# Patient Record
Sex: FEMALE | Race: BLACK OR AFRICAN AMERICAN | Employment: FULL TIME | URBAN - METROPOLITAN AREA
[De-identification: names, ages, dates, MRNs, and addresses within clinical notes are randomized per-mention and may not be internally consistent; named-entity substitution may affect disease eponyms.]

---

## 2020-10-08 ENCOUNTER — HOSPITAL ENCOUNTER (EMERGENCY)
Age: 45
Discharge: HOME | End: 2020-10-08
Attending: EMERGENCY MEDICINE
Payer: COMMERCIAL

## 2020-10-08 VITALS
HEIGHT: 67 IN | SYSTOLIC BLOOD PRESSURE: 164 MMHG | WEIGHT: 260 LBS | RESPIRATION RATE: 16 BRPM | DIASTOLIC BLOOD PRESSURE: 81 MMHG | TEMPERATURE: 98.1 F | BODY MASS INDEX: 40.81 KG/M2 | HEART RATE: 81 BPM | OXYGEN SATURATION: 100 %

## 2020-10-08 DIAGNOSIS — R51.9 ACUTE NONINTRACTABLE HEADACHE, UNSPECIFIED HEADACHE TYPE: Primary | ICD-10-CM

## 2020-10-08 LAB
ANION GAP SERPL CALC-SCNC: 5 MMOL/L (ref 5–15)
BUN SERPL-MCNC: 11 MG/DL (ref 6–20)
BUN/CREAT SERPL: 10 (ref 12–20)
CALCIUM SERPL-MCNC: 8.9 MG/DL (ref 8.5–10.1)
CHLORIDE SERPL-SCNC: 106 MMOL/L (ref 97–108)
CO2 SERPL-SCNC: 25 MMOL/L (ref 21–32)
COMMENT, HOLDF: NORMAL
CREAT SERPL-MCNC: 1.11 MG/DL (ref 0.55–1.02)
ERYTHROCYTE [DISTWIDTH] IN BLOOD BY AUTOMATED COUNT: 14.8 % (ref 11.5–14.5)
GLUCOSE SERPL-MCNC: 105 MG/DL (ref 65–100)
HCT VFR BLD AUTO: 37.9 % (ref 35–47)
HGB BLD-MCNC: 11.6 G/DL (ref 11.5–16)
MCH RBC QN AUTO: 23.9 PG (ref 26–34)
MCHC RBC AUTO-ENTMCNC: 30.6 G/DL (ref 30–36.5)
MCV RBC AUTO: 78 FL (ref 80–99)
NRBC # BLD: 0 K/UL (ref 0–0.01)
NRBC BLD-RTO: 0 PER 100 WBC
PLATELET # BLD AUTO: 248 K/UL (ref 150–400)
PMV BLD AUTO: 11.4 FL (ref 8.9–12.9)
POTASSIUM SERPL-SCNC: 4.6 MMOL/L (ref 3.5–5.1)
RBC # BLD AUTO: 4.86 M/UL (ref 3.8–5.2)
SAMPLES BEING HELD,HOLD: NORMAL
SODIUM SERPL-SCNC: 136 MMOL/L (ref 136–145)
TROPONIN I SERPL-MCNC: <0.05 NG/ML
WBC # BLD AUTO: 5.3 K/UL (ref 3.6–11)

## 2020-10-08 PROCEDURE — 74011250636 HC RX REV CODE- 250/636: Performed by: NURSE PRACTITIONER

## 2020-10-08 PROCEDURE — 85027 COMPLETE CBC AUTOMATED: CPT

## 2020-10-08 PROCEDURE — 93005 ELECTROCARDIOGRAM TRACING: CPT

## 2020-10-08 PROCEDURE — 99284 EMERGENCY DEPT VISIT MOD MDM: CPT

## 2020-10-08 PROCEDURE — 96374 THER/PROPH/DIAG INJ IV PUSH: CPT

## 2020-10-08 PROCEDURE — 96375 TX/PRO/DX INJ NEW DRUG ADDON: CPT

## 2020-10-08 PROCEDURE — 74011000250 HC RX REV CODE- 250: Performed by: NURSE PRACTITIONER

## 2020-10-08 PROCEDURE — 80048 BASIC METABOLIC PNL TOTAL CA: CPT

## 2020-10-08 PROCEDURE — 84484 ASSAY OF TROPONIN QUANT: CPT

## 2020-10-08 PROCEDURE — 36415 COLL VENOUS BLD VENIPUNCTURE: CPT

## 2020-10-08 RX ORDER — SUMATRIPTAN 25 MG/1
25 TABLET, FILM COATED ORAL
Qty: 20 TAB | Refills: 0 | Status: SHIPPED | OUTPATIENT
Start: 2020-10-08

## 2020-10-08 RX ORDER — DIPHENHYDRAMINE HYDROCHLORIDE 50 MG/ML
25 INJECTION, SOLUTION INTRAMUSCULAR; INTRAVENOUS
Status: COMPLETED | OUTPATIENT
Start: 2020-10-08 | End: 2020-10-08

## 2020-10-08 RX ORDER — BUTALBITAL, ACETAMINOPHEN AND CAFFEINE 300; 40; 50 MG/1; MG/1; MG/1
1 CAPSULE ORAL
Qty: 30 CAP | Refills: 0 | Status: SHIPPED | OUTPATIENT
Start: 2020-10-08

## 2020-10-08 RX ORDER — KETOROLAC TROMETHAMINE 30 MG/ML
30 INJECTION, SOLUTION INTRAMUSCULAR; INTRAVENOUS
Status: COMPLETED | OUTPATIENT
Start: 2020-10-08 | End: 2020-10-08

## 2020-10-08 RX ADMIN — KETOROLAC TROMETHAMINE 30 MG: 30 INJECTION, SOLUTION INTRAMUSCULAR at 21:33

## 2020-10-08 RX ADMIN — SODIUM CHLORIDE 1000 ML: 9 INJECTION, SOLUTION INTRAVENOUS at 21:34

## 2020-10-08 RX ADMIN — PROCHLORPERAZINE EDISYLATE 10 MG: 5 INJECTION INTRAMUSCULAR; INTRAVENOUS at 21:34

## 2020-10-08 RX ADMIN — DIPHENHYDRAMINE HYDROCHLORIDE 25 MG: 50 INJECTION, SOLUTION INTRAMUSCULAR; INTRAVENOUS at 21:34

## 2020-10-08 NOTE — ED TRIAGE NOTES
Pt arrives ambulatory c/o 7/10 posterior aching HA and loss of appetite x 3 days with minimal relief with tylenol and 6/10 intermittent mid to L sided stabbing CP that sometimes radiates to her L arm x 2 hours. Pt denies known history of HTN, arrives to ED with 169/100.  Denies SOB, N/V.

## 2020-10-08 NOTE — LETTER
[address] 
[address] [place] Work/School Note Date: 10/8/2020 To Whom It May concern: 
 
Isabel Randolph was seen and treated today in the emergency room by the following provider(s): 
Attending Provider: Murray Sagastume MD 
Nurse Practitioner: Ralene Seip, NP. Isabel Randolph may return to work on 10/10/20. Sincerely, Concepcion Shaw NP

## 2020-10-09 LAB
ATRIAL RATE: 78 BPM
CALCULATED P AXIS, ECG09: 29 DEGREES
CALCULATED R AXIS, ECG10: -7 DEGREES
CALCULATED T AXIS, ECG11: 5 DEGREES
DIAGNOSIS, 93000: NORMAL
P-R INTERVAL, ECG05: 172 MS
Q-T INTERVAL, ECG07: 404 MS
QRS DURATION, ECG06: 98 MS
QTC CALCULATION (BEZET), ECG08: 460 MS
VENTRICULAR RATE, ECG03: 78 BPM

## 2020-10-09 NOTE — ED PROVIDER NOTES
This is a 28-year-old female with a past medical history of GERD and seasonal allergies who presents ER today for the chief complaint of headache, chest pain, dysphoric mood, and feeling stressed. According to the patient she has had a continual headache for the last 3 days described as \"throbbing and stabbing\" over the right side of her scalp that is also associated with photophobia, nausea, and fatigue. Headache exacerbated by physical exertion and environmental stimuli. Alleviating factors include ice placed on the back of her neck and Tylenol. Patient also reports several months of intermittent left-sided chest pain described as \"sharp and shooting\" that resolves within about 30 seconds of its onset. Nonexertional, not associated with any other cardiorespiratory complaints. Patient appears very tearful in triage, and admits to feeling extremely depressed and stressed out. When asked about suicidal ideations, she attempted to change the subject and started to cry incessantly. Denies suicide attempt. ROS negative for: Illicit drug use, abuse, sensorimotor disturbances, visual disturbances, diaphoresis, shortness of breath           Past Medical History:   Diagnosis Date    GERD (gastroesophageal reflux disease)     Headache     Low back pain     Obesity     Sinus congestion        Past Surgical History:   Procedure Laterality Date    HX ORTHOPAEDIC      acl right knee    HX TONSILLECTOMY           History reviewed. No pertinent family history.     Social History     Socioeconomic History    Marital status: SINGLE     Spouse name: Not on file    Number of children: Not on file    Years of education: Not on file    Highest education level: Not on file   Occupational History    Not on file   Social Needs    Financial resource strain: Not on file    Food insecurity     Worry: Not on file     Inability: Not on file    Transportation needs     Medical: Not on file     Non-medical: Not on file Tobacco Use    Smoking status: Never Smoker    Smokeless tobacco: Never Used   Substance and Sexual Activity    Alcohol use: Yes     Alcohol/week: 0.0 standard drinks    Drug use: No    Sexual activity: Not on file   Lifestyle    Physical activity     Days per week: Not on file     Minutes per session: Not on file    Stress: Not on file   Relationships    Social connections     Talks on phone: Not on file     Gets together: Not on file     Attends Jewish service: Not on file     Active member of club or organization: Not on file     Attends meetings of clubs or organizations: Not on file     Relationship status: Not on file    Intimate partner violence     Fear of current or ex partner: Not on file     Emotionally abused: Not on file     Physically abused: Not on file     Forced sexual activity: Not on file   Other Topics Concern    Not on file   Social History Narrative    Not on file         ALLERGIES: Patient has no known allergies. Review of Systems   Constitutional: Negative for fever. HENT: Negative for sore throat. Eyes: Positive for photophobia. Negative for visual disturbance. Respiratory: Negative for shortness of breath. Cardiovascular: Positive for chest pain. Gastrointestinal: Positive for nausea. Negative for abdominal pain and vomiting. Genitourinary: Negative for dysuria. Musculoskeletal: Negative for myalgias. Skin: Negative for rash. Neurological: Positive for headaches. Negative for dizziness. Psychiatric/Behavioral: Positive for dysphoric mood and sleep disturbance. Negative for self-injury. The patient is nervous/anxious. Vitals:    10/08/20 2002   BP: (!) 169/100   Pulse: 79   Resp: 17   Temp: 97.9 °F (36.6 °C)   SpO2: 100%   Weight: 117.9 kg (260 lb)   Height: 5' 7\" (1.702 m)            Physical Exam  Vitals signs and nursing note reviewed. Constitutional:       General: She is not in acute distress. Appearance: Normal appearance.  She is not ill-appearing. HENT:      Head: Normocephalic and atraumatic. Right Ear: External ear normal.      Left Ear: External ear normal.      Nose: Nose normal.      Mouth/Throat:      Mouth: Mucous membranes are moist.      Pharynx: Oropharynx is clear. Eyes:      General: No scleral icterus. Extraocular Movements: Extraocular movements intact. Conjunctiva/sclera: Conjunctivae normal.      Pupils: Pupils are equal, round, and reactive to light. Neck:      Musculoskeletal: Normal range of motion and neck supple. No neck rigidity or muscular tenderness. Cardiovascular:      Rate and Rhythm: Normal rate and regular rhythm. Chest Wall: No thrill. Pulses: Normal pulses. Radial pulses are 2+ on the right side and 2+ on the left side. Heart sounds: Normal heart sounds. No murmur. Pulmonary:      Effort: Pulmonary effort is normal.      Breath sounds: Normal breath sounds. Abdominal:      General: Abdomen is flat. Bowel sounds are normal. There is no distension. Palpations: Abdomen is soft. Tenderness: There is no guarding. Musculoskeletal: Normal range of motion. Skin:     General: Skin is warm and dry. Coloration: Skin is not pale. Neurological:      General: No focal deficit present. Mental Status: She is alert and oriented to person, place, and time. Mental status is at baseline. Cranial Nerves: Cranial nerves are intact. Sensory: Sensation is intact. Coordination: Coordination is intact. Gait: Gait is intact. Psychiatric:         Mood and Affect: Mood is depressed. Affect is tearful. Behavior: Behavior normal.         Thought Content:  Thought content normal.         Judgment: Judgment normal.          [facility]     VITAL SIGNS:  Patient Vitals for the past 4 hrs:   Temp Pulse Resp BP SpO2   10/08/20 2002 97.9 °F (36.6 °C) 79 17 (!) 169/100 100 %         LABS:  Recent Results (from the past 6 hour(s))   EKG, 12 LEAD, INITIAL    Collection Time: 10/08/20  8:11 PM   Result Value Ref Range    Ventricular Rate 78 BPM    Atrial Rate 78 BPM    P-R Interval 172 ms    QRS Duration 98 ms    Q-T Interval 404 ms    QTC Calculation (Bezet) 460 ms    Calculated P Axis 29 degrees    Calculated R Axis -7 degrees    Calculated T Axis 5 degrees    Diagnosis       Normal sinus rhythm  Nonspecific T wave abnormality  No previous ECGs available     SAMPLES BEING HELD    Collection Time: 10/08/20  8:15 PM   Result Value Ref Range    SAMPLES BEING HELD 1LAV 1PST 1BLU 1RED     COMMENT        Add-on orders for these samples will be processed based on acceptable specimen integrity and analyte stability, which may vary by analyte. IMAGING:  No orders to display         Medications During Visit:  Medications   ketorolac (TORADOL) injection 30 mg (30 mg IntraVENous Given 10/8/20 2133)   prochlorperazine (COMPAZINE) with saline injection 10 mg (10 mg IntraVENous Given 10/8/20 2134)   sodium chloride 0.9 % bolus infusion 1,000 mL (0 mL IntraVENous IV Completed 10/8/20 2239)   diphenhydrAMINE (BENADRYL) injection 25 mg (25 mg IntraVENous Given 10/8/20 2134)         DECISION MAKING:  Nicko Ravi is a 38 y.o. female who comes in as above. Patient reports minor improvement in headache after the above migraine cocktail. BSMART consulted for assistance with outpatient follow-up, stress management, and coping mechanisms. Patient does not meet admission criteria. Plan:  Fioricet and Imitrex as needed for migraine headache. Neuroimaging deferred due to normal examination and classic features of headache. Follow-up with outpatient resources provided by [facility]. IMPRESSION:  1.  Acute nonintractable headache, unspecified headache type        DISPOSITION:  Discharged      Discharge Medication List as of 10/8/2020 11:06 PM      START taking these medications    Details   butalbital-acetaminophen-caff (Fioricet) -40 mg per capsule Take 1 Cap by mouth every four (4) hours as needed for Headache., Print, Disp-30 Cap,R-0      SUMAtriptan (Imitrex) 25 mg tablet Take 1 Tab by mouth every four (4) hours as needed for Migraine. Max 200mg in 24 hours, may repeat every 2 hours once, Print, Disp-20 Tab,R-0         CONTINUE these medications which have NOT CHANGED    Details   naproxen (NAPROSYN) 500 mg tablet Take 1 Tab by mouth every twelve (12) hours as needed for Pain., Normal, Disp-20 Tab, R-0      HYDROcodone-acetaminophen (NORCO) 5-325 mg per tablet Take 1 Tab by mouth every six (6) hours as needed for Pain. Max Daily Amount: 4 Tabs., Print, Disp-6 Tab, R-0              Follow-up Information     Follow up With Specialties Details Why Contact Fatimah Frazier MD Internal Medicine  As needed, If symptoms worsen [facility] [phone]              The patient is asked to follow-up with their primary care provider in the next several days. They are to call tomorrow for an appointment. The patient is asked to return promptly for any increased concerns or worsening of symptoms. They can return to this emergency department or any other emergency department.

## 2020-10-09 NOTE — BSMART NOTE
Comprehensive Assessment Form Part 1    Section I - Disposition    Axis I - Adjustment Disorder  Axis II - Deferred  Axis III -   Past Medical History:   Diagnosis Date    GERD (gastroesophageal reflux disease)     Headache     Low back pain     Obesity     Sinus congestion    Axis IV - Work Stress; Home Stress; School Stress    The Medical Doctor to Psychiatrist conference was not completed. The Medical Doctor is in agreement with Psychiatrist disposition because of (reason) N/A. The plan is to discharge pt home with list of OP resources. The on-call Psychiatrist consulted was Dr. Velvet Sanders. The admitting Psychiatrist will be Dr. Velvet Sanders. The admitting Diagnosis is N/A. The Payor source is [facility]. Section II - Integrated Summary  Summary:  Pt is a 39 yo SAAF who reports to the ED on a VOL basis with a chief complaint of headache and loss of appetite. Pt deneis SI/HI/AVH to this writer and is oriented x4. Pt states that she tends of internalize stress and she has been under a great deal of stress at home, at work, and as an [employer] student. Pt states that she has taken advance of EAP therapy sessions in the past and would like to est an OP therapist again. This writer discusses pt's tx with her attending physician who is agreement to discharge pt to establish therapy with OP provider. The patienthas demonstrated mental capacity to provide informed consent. The information is given by the patient. The Chief Complaint is headache and increased stress. The Precipitant Factors are work/home/school stress and decreased appetite and insomnia. Previous Hospitalizations: N/A  The patient has not previously been in restraints. Current Psychiatrist and/or  is N/A. Lethality Assessment:    The potential for suicide noted by the following: pt denies. The potential for homicide is not noted. The patient has not been a perpetrator of sexual or physical abuse.   There are not pending charges. The patient is not felt to be at risk for self harm or harm to others. The attending nurse was advised N/A. Section III - Psychosocial  The patient's overall mood and attitude is tired and pt reports, \"I feel woozy from the [place]". Feelings of helplessness and hopelessness are not observed. Generalized anxiety is not observed. Panic is not observed. Phobias are not observed. Obsessive compulsive tendencies are not observed. Section IV - Mental Status Exam  The patient's appearance shows no evidence of impairment. The patient's behavior shows no evidence of impairment. The patient is oriented to time, place, person and situation. The patient's speech shows no evidence of impairment. The patient's mood is calm and cooperative. The range of affect is flat. The patient's thought content demonstrates no evidence of impairment. The thought process shows no evidence of impairment. The patient's perception shows no evidence of impairment. The patient's memory shows no evidence of impairment. The patient's appetite is decreased. The patient's sleep has evidence of insomnia. The patient's insight shows no evidence of impairment. The patient's judgement shows no evidence of impairment. Section V - Substance Abuse  The patient is not using substances. Section VI - Living Arrangements  The patient is single. The patient lives with a significant other. The patient has no children. The patient does plan to return home upon discharge. The patient does not have legal issues pending. The patient's source of income comes from employment. Pentecostal and cultural practices have not been voiced at this time. The patient's greatest support comes from partner and this person will be involved with the treatment.     The patient has not been in an event described as horrible or outside the realm of ordinary life experience either currently or in the past.  The patient has not been a victim of sexual/physical abuse. Section VII - Other Areas of Clinical Concern  The highest grade achieved is some college with the overall quality of school experience being described as stressful. The patient is currently employed and speaks [place] as a primary language. The patient has no communication impairments affecting communication. The patient's preference for learning can be described as: can read and write adequately. The patient's hearing is normal.  The patient's vision is impaired and  wears glasses or contacts. Therapists in [facility] participating with [address]:     [facility]   [facility]   Deana Villalobos [phone]   Pelon Kay [phone]     [facility] [address] [place] [address]   [phone]     [facility] is located in the [facility] [place] of [facility]   Steven Adam    [phone]       Vanessa Osman MA, [place], Licensed Resident in Counseling

## 2023-03-30 ENCOUNTER — OFFICE VISIT (OUTPATIENT)
Dept: INTERNAL MEDICINE CLINIC | Age: 48
End: 2023-03-30
Payer: COMMERCIAL

## 2023-03-30 VITALS
BODY MASS INDEX: 45.99 KG/M2 | OXYGEN SATURATION: 100 % | HEIGHT: 67 IN | SYSTOLIC BLOOD PRESSURE: 130 MMHG | RESPIRATION RATE: 16 BRPM | DIASTOLIC BLOOD PRESSURE: 82 MMHG | TEMPERATURE: 98.1 F | WEIGHT: 293 LBS | HEART RATE: 84 BPM

## 2023-03-30 DIAGNOSIS — Z12.11 COLON CANCER SCREENING: ICD-10-CM

## 2023-03-30 DIAGNOSIS — B35.1 TOENAIL FUNGUS: ICD-10-CM

## 2023-03-30 DIAGNOSIS — E55.9 VITAMIN D DEFICIENCY: ICD-10-CM

## 2023-03-30 DIAGNOSIS — Z13.220 LIPID SCREENING: ICD-10-CM

## 2023-03-30 DIAGNOSIS — Z11.59 NEED FOR HEPATITIS C SCREENING TEST: ICD-10-CM

## 2023-03-30 DIAGNOSIS — Z00.00 ENCOUNTER FOR MEDICAL EXAMINATION TO ESTABLISH CARE: Primary | ICD-10-CM

## 2023-03-30 PROCEDURE — 99202 OFFICE O/P NEW SF 15 MIN: CPT | Performed by: INTERNAL MEDICINE

## 2023-03-30 NOTE — PROGRESS NOTES
HISTORY OF PRESENT ILLNESS  Danny Trujillo is a 56 y.o. female. Patient was seen to establish care. Reports that she saw her PCP 2 years ago or more. Was on BP control medication but was taken off after this resolved. Has not been back on this since. Is looking to get her nutrition  stable and lose weight. Has always focused on her kids. Will also have a lot of dental work done and this will include implants. Asking to get back into podiatry. Was seen by one a few years ago, but never got her results back to review fungus of the toes. Mammogram was done at [facility]. Will request. Will need a colon screen   New Patient    Weight Management  Visit Vitals  /82 (BP 1 Location: Left upper arm)   Pulse 84   Temp 98.1 °F (36.7 °C)   Resp 16   Ht 5' 7\" (1.702 m)   Wt 303 lb (137.4 kg)   LMP 02/28/2023   SpO2 100%   BMI 47.46 kg/m²     Past Medical History:   Diagnosis Date    GERD (gastroesophageal reflux disease)     Headache     Low back pain     Obesity     Sinus congestion      Past Surgical History:   Procedure Laterality Date    HX ORTHOPAEDIC      acl right knee    HX TONSILLECTOMY       Family History   Problem Relation Age of Onset    Asthma Mother      Outpatient Encounter Medications as of 3/30/2023   Medication Sig Dispense Refill    [DISCONTINUED] butalbital-acetaminophen-caff (Fioricet) -40 mg per capsule Take 1 Cap by mouth every four (4) hours as needed for Headache. 30 Cap 0    [DISCONTINUED] SUMAtriptan (Imitrex) 25 mg tablet Take 1 Tab by mouth every four (4) hours as needed for Migraine. Max 200mg in 24 hours, may repeat every 2 hours once 20 Tab 0    [DISCONTINUED] naproxen (NAPROSYN) 500 mg tablet Take 1 Tab by mouth every twelve (12) hours as needed for Pain. 20 Tab 0    [DISCONTINUED] HYDROcodone-acetaminophen (NORCO) 5-325 mg per tablet Take 1 Tab by mouth every six (6) hours as needed for Pain. Max Daily Amount: 4 Tabs.  6 Tab 0     No facility-administered encounter medications on file as of 3/30/2023. Review of Systems   Constitutional:  Positive for weight gain. Negative for chills and fever. Respiratory: Negative. Cardiovascular: Negative. Gastrointestinal: Negative. Genitourinary: Negative. Musculoskeletal: Negative. Neurological: Negative. Psychiatric/Behavioral: Negative. Physical Exam  Vitals and nursing note reviewed. Constitutional:       Appearance: She is obese. Cardiovascular:      Rate and Rhythm: Normal rate and regular rhythm. Pulmonary:      Effort: Pulmonary effort is normal.      Breath sounds: Normal breath sounds. Abdominal:      General: Bowel sounds are normal.      Palpations: Abdomen is soft. Musculoskeletal:         General: Normal range of motion. Cervical back: Neck supple. Skin:     General: Skin is warm. Neurological:      Mental Status: She is alert and oriented to person, place, and time. Psychiatric:         Behavior: Behavior normal.       ASSESSMENT and PLAN  Diagnoses and all orders for this visit:    1. Encounter for medical examination to establish care  -     METABOLIC PANEL, COMPREHENSIVE; Future  -     CBC WITH AUTOMATED DIFF; Future  -     TSH 3RD GENERATION; Future    2. Lipid screening  -     LIPID PANEL; Future    3. Vitamin D deficiency  -     VITAMIN D, 25 HYDROXY; Future    4. Need for hepatitis C screening test  -     HEPATITIS C AB; Future    5. Toenail fungus  -     REFERRAL TO PODIATRY    6. BMI 45.0-49.9, adult (HCC)  -     REFERRAL TO DIETITIAN    7.  Colon cancer screening  -     COLOGUARD TEST (FECAL DNA COLORECTAL CANCER SCREENING)      Follow-up and Dispositions    Return in 1 year (on 3/30/2024), or if symptoms worsen or fail to improve.       lab results and schedule of future lab studies reviewed with patient  reviewed diet, exercise and weight control  reviewed medications and side effects in detail

## 2023-03-30 NOTE — PROGRESS NOTES
Rm    Chief Complaint   Patient presents with    New Patient     Establishing care    Elevated Blood Pressure     Goes up and down    Weight Management        Visit Vitals  /82 (BP 1 Location: Left upper arm)   Pulse 84   Temp 98.1 °F (36.7 °C)   Resp 16   Ht 5' 7\" (1.702 m)   Wt 303 lb (137.4 kg)   LMP 02/28/2023   SpO2 100%   BMI 47.46 kg/m²        1. Have you been to the ER, urgent care clinic since your last visit? Hospitalized since your last visit? No    2. Have you seen or consulted any other health care providers outside of the [address] [place] [address] since your last visit? Include any pap smears or colon screening. No     Health Maintenance Due   Topic Date Due    Hepatitis C Screening  Never done    Depression Screen  Never done    COVID-19 Vaccine (1) Never done    DTaP/Tdap/Td series (1 - Tdap) Never done    Cervical cancer screen  Never done    Lipid Screen  01/07/2021    Colorectal Cancer Screening Combo  Never done    Flu Vaccine (1) Never done        3 most recent PHQ Screens 3/30/2023   PHQ Not Done -   Little interest or pleasure in doing things Not at all   Feeling down, depressed, irritable, or hopeless Not at all   Total Score PHQ 2 0        No flowsheet data found.     Learning Assessment 4/26/2016   PRIMARY LEARNER Patient   HIGHEST LEVEL OF EDUCATION - PRIMARY LEARNER  2 YEARS OF COLLEGE   PRIMARY LANGUAGE ENGLISH   LEARNER PREFERENCE PRIMARY LISTENING   ANSWERED BY patient   RELATIONSHIP SELF

## 2023-03-31 LAB
25(OH)D3+25(OH)D2 SERPL-MCNC: 35.6 NG/ML (ref 30–100)
ALBUMIN SERPL-MCNC: 4.5 G/DL (ref 3.8–4.8)
ALBUMIN/GLOB SERPL: 1.3 {RATIO} (ref 1.2–2.2)
ALP SERPL-CCNC: 66 IU/L (ref 44–121)
ALT SERPL-CCNC: 14 IU/L (ref 0–32)
AST SERPL-CCNC: 16 IU/L (ref 0–40)
BASOPHILS # BLD AUTO: 0 X10E3/UL (ref 0–0.2)
BASOPHILS NFR BLD AUTO: 1 %
BILIRUB SERPL-MCNC: 0.4 MG/DL (ref 0–1.2)
BUN SERPL-MCNC: 9 MG/DL (ref 6–24)
BUN/CREAT SERPL: 10 (ref 9–23)
CALCIUM SERPL-MCNC: 9.4 MG/DL (ref 8.7–10.2)
CHLORIDE SERPL-SCNC: 100 MMOL/L (ref 96–106)
CHOLEST SERPL-MCNC: 227 MG/DL (ref 100–199)
CO2 SERPL-SCNC: 22 MMOL/L (ref 20–29)
CREAT SERPL-MCNC: 0.89 MG/DL (ref 0.57–1)
EGFRCR SERPLBLD CKD-EPI 2021: 81 ML/MIN/1.73
EOSINOPHIL # BLD AUTO: 0.1 X10E3/UL (ref 0–0.4)
EOSINOPHIL NFR BLD AUTO: 2 %
ERYTHROCYTE [DISTWIDTH] IN BLOOD BY AUTOMATED COUNT: 15.1 % (ref 11.7–15.4)
GLOBULIN SER CALC-MCNC: 3.4 G/DL (ref 1.5–4.5)
GLUCOSE SERPL-MCNC: 95 MG/DL (ref 70–99)
HCT VFR BLD AUTO: 37.7 % (ref 34–46.6)
HCV IGG SERPL QL IA: NON REACTIVE
HDLC SERPL-MCNC: 55 MG/DL
HGB BLD-MCNC: 12.1 G/DL (ref 11.1–15.9)
IMM GRANULOCYTES # BLD AUTO: 0 X10E3/UL (ref 0–0.1)
IMM GRANULOCYTES NFR BLD AUTO: 0 %
IMP & REVIEW OF LAB RESULTS: NORMAL
LDLC SERPL CALC-MCNC: 157 MG/DL (ref 0–99)
LYMPHOCYTES # BLD AUTO: 1.8 X10E3/UL (ref 0.7–3.1)
LYMPHOCYTES NFR BLD AUTO: 38 %
MCH RBC QN AUTO: 23.5 PG (ref 26.6–33)
MCHC RBC AUTO-ENTMCNC: 32.1 G/DL (ref 31.5–35.7)
MCV RBC AUTO: 73 FL (ref 79–97)
MONOCYTES # BLD AUTO: 0.5 X10E3/UL (ref 0.1–0.9)
MONOCYTES NFR BLD AUTO: 11 %
NEUTROPHILS # BLD AUTO: 2.3 X10E3/UL (ref 1.4–7)
NEUTROPHILS NFR BLD AUTO: 48 %
PLATELET # BLD AUTO: 294 X10E3/UL (ref 150–450)
POTASSIUM SERPL-SCNC: 4.3 MMOL/L (ref 3.5–5.2)
PROT SERPL-MCNC: 7.9 G/DL (ref 6–8.5)
RBC # BLD AUTO: 5.14 X10E6/UL (ref 3.77–5.28)
SODIUM SERPL-SCNC: 138 MMOL/L (ref 134–144)
TRIGL SERPL-MCNC: 85 MG/DL (ref 0–149)
TSH SERPL DL<=0.005 MIU/L-ACNC: 0.73 UIU/ML (ref 0.45–4.5)
VLDLC SERPL CALC-MCNC: 15 MG/DL (ref 5–40)
WBC # BLD AUTO: 4.8 X10E3/UL (ref 3.4–10.8)

## 2023-03-31 NOTE — PROGRESS NOTES
MCH and MCV low. Please add iron and ferritin   Her lipids are high. Watch the fats, starches and heavy meats.  More vegetables   All other labs stable

## 2023-04-02 LAB
FERRITIN SERPL-MCNC: 60 NG/ML (ref 15–150)
IRON SATN MFR SERPL: 18 % (ref 15–55)
IRON SERPL-MCNC: 51 UG/DL (ref 27–159)
SPECIMEN STATUS REPORT, ROLRST: NORMAL
TIBC SERPL-MCNC: 282 UG/DL (ref 250–450)
UIBC SERPL-MCNC: 231 UG/DL (ref 131–425)

## 2023-04-26 ENCOUNTER — HOSPITAL ENCOUNTER (OUTPATIENT)
Dept: NUTRITION | Age: 48
Discharge: HOME | End: 2023-04-26
Payer: COMMERCIAL

## 2023-04-26 DIAGNOSIS — R73.03 PREDIABETES: ICD-10-CM

## 2023-04-26 DIAGNOSIS — E66.01 CLASS 3 SEVERE OBESITY DUE TO EXCESS CALORIES WITHOUT SERIOUS COMORBIDITY WITH BODY MASS INDEX (BMI) OF 45.0 TO 49.9 IN ADULT (HCC): Primary | ICD-10-CM

## 2023-04-26 DIAGNOSIS — E78.00 HIGH CHOLESTEROL: ICD-10-CM

## 2023-04-26 PROCEDURE — 97802 MEDICAL NUTRITION INDIV IN: CPT | Performed by: DIETITIAN, REGISTERED

## 2023-04-26 NOTE — PROGRESS NOTES
[address] [facility]     Nutrition Assessment - Medical Nutrition Therapy   Outpatient Initial Evaluation         Patient Name: Wilfred Curiel : 1975   Treatment Diagnosis: Z68.42 (ICD-10-CM) - BMI 45.0-49.9, adult ([address]   E66.9 (ICD-10-CM) - Obesity   Z71.3 (ICD-10-CM) - Dietary counseling and surveillance   E78.00 (ICD-10-CM) - High cholesterol      Referral Source: Idalia Kaufman NP Start of Care [facility], [place]): 2023     In time:   245pm             Out time:   340pm   Total Treatment Time (min):   54     Gender: female Age: 56 y.o. Ht: 67 in Wt:  309.8 lb 137.4 kg   BMI: 47.46 (class III obesity) BMR   Male  BMR Female      Past Medical History:  Patient Active Problem List   Diagnosis Code    Obesity E66.9    Sinus congestion R09.81    GERD (gastroesophageal reflux disease) K21.9    Low back pain M54.50        Pertinent Medications:   No current outpatient medications on file. Biochemical Data:   Lab Results   Component Value Date/Time    Hemoglobin A1c 6.0 (H) 2016 01:15 PM     Lab Results   Component Value Date/Time    Sodium 138 2023 09:25 AM    Potassium 4.3 2023 09:25 AM    Chloride 100 2023 09:25 AM    CO2 22 2023 09:25 AM    Anion gap 5 10/08/2020 08:15 PM    Glucose 95 2023 09:25 AM    BUN 9 2023 09:25 AM    Creatinine 0.89 2023 09:25 AM    BUN/Creatinine ratio 10 2023 09:25 AM    GFR est AA >60 10/08/2020 08:15 PM    GFR est non-AA 54 (L) 10/08/2020 08:15 PM    Calcium 9.4 2023 09:25 AM    Bilirubin, total 0.4 2023 09:25 AM    Alk.  phosphatase 66 2023 09:25 AM    Protein, total 7.9 2023 09:25 AM    Albumin 4.5 2023 09:25 AM    A-G Ratio 1.3 2023 09:25 AM    ALT (SGPT) 14 2023 09:25 AM    AST (SGOT) 16 2023 09:25 AM     Lab Results   Component Value Date/Time    Cholesterol, total 227 (H) 2023 09:25 AM    HDL Cholesterol 55 2023 09:25 AM LDL, calculated 157 (H) 03/30/2023 09:25 AM    LDL, calculated 117 (H) 01/07/2016 01:15 PM    VLDL, calculated 15 03/30/2023 09:25 AM    VLDL, calculated 12 01/07/2016 01:15 PM    Triglyceride 85 03/30/2023 09:25 AM        Assessment:   Pt is a 55 yo female seen today for obesity and prediabetes. Pt recent lab results show A1c 6.0 (H), Chol 227 (H), and  (H). Pt has tried to eat differently but not necessarily dieting. Pt noted that the challenge is needing quick and easy options. Pt lives with wife at home. Pt wife is doing most of the cooking and shopping as she is working form home. Pt noted that her wife has tendency to not cook as many vegetables so meals may be more carb dense. Pt is limiting meals eaten out in the week. Pt is also very good about drinking water throughout the day. Pt is working at [employer] and noted being at desk and on her feet throughout the day. Pt has previous back injury that limits exercise. Pt is trying to walk a half mile each day. Pt was previously doing more weight training and noted wanting to get back into a gym routine as well.      Wt Readings from Last 3 Encounters:   03/30/23 137.4 kg (303 lb)   10/08/20 117.9 kg (260 lb)   10/30/16 103 kg (227 lb)     Lab Results   Component Value Date/Time    Hemoglobin A1c 6.0 (H) 01/07/2016 01:15 PM            Food & Nutrition:   B- Yogurt and banana   S-   L- Bowl of chili; salad with salmon; Asian chicken and broccoli w/ brown rice  S- Baked chips, Munchies, fruit, pumpkin seeds, almonds   D- Pizza Hut pan pizza; meat and starch  S- Occasionally dessert food- shared with partner  Drinks: Coffee or espresso (with 1-2 pumps of syrup and almondmilk), 2-2.5 L water, 2x week 12 oz ginger ale        Estimate Needs:    Equation( [x] MSJ ; []  HBE; [] Garrido Morale; [] other)  * Activity Factor (1.2) - 500   Calories: 1900  Protein: 119 Carbs: 214 Fat: 63   Kcal/day  g/day  g/day  g/day        percent: 25  45  30               Nutrition Diagnosis Food and nutrition related knowledge deficit R/T lack of prior education for healthy weight loss AEB request for counseling    Obesity R/T excessive caloric intake, inactivity AEB pt 24-hour food recall, BMI >40, and pt stated lack of activity     Nutrition Intervention &  Education: Educated pt on the pathophysiology of prediabetes, insulin resistance and the rationale for dietary modifications and increased activity. Educated pt on lean proteins, healthy fats, non-starchy vegetables, and complex carbohydrate food sources. Discussed limiting carbohydrates, label reading, meal timing, and appropriate serving sizes. Encouraged pt to avoid sugary beverages. Reviewed meal builder tool, calorie and macro breakdown as well as exercise parameters. Handouts Provided: []  Carbohydrates  []  Protein  [x]  Non-starchy Vegatbles  []  Food Label  []  Meal and Snack Ideas  []  Food Journals []  Diabetes  []  Cholesterol  []  Sodium  []  Gen Nutr Guidelines  []  SBGM Guidelines  [x]  Others: Meal Builder   Information Reviewed with: Pt    Readiness to Change Stage: []  Pre-contemplative    []  Contemplative  []  Preparation               [x]  Action                  []  Maintenance   Potential Barriers to Learning: []  Decline in memory    []  Language barrier   []  Other:  []  Emotional                  [x]  Limited mobility     []  None  []  Lack of motivation     [] Vision, hearing or cognitive impairment   Expected Compliance: Pt expressed comprehension, high motivation, and compliance is expected.         Nutritional Goal - To promote lifestyle changes to result in:    [x]  Weight loss  [x]  Improved diabetic control  [x]  Decreased cholesterol levels  []  Decreased blood pressure  []  Weight maintenance []  Preventing any interactions associated with food allergies  []  Adequate weight gain toward goal weight  []  Other:        Patient Goals:   - Improve physical activity w/goal to walk for 1/2 mile 5-7 times per week and get to the gym for weight training 3x week. - Increase accountability and awareness of food choices by recording food and beverage intake by using a food journal daily.      Dietitian Signature: Geovany Webster RDN Date: 4/26/2023   Follow-up: 2-4 weeks  Time:  300PM

## 2023-06-19 ENCOUNTER — TELEMEDICINE (OUTPATIENT)
Age: 48
End: 2023-06-19
Payer: COMMERCIAL

## 2023-06-19 DIAGNOSIS — R06.83 LOUD SNORING: Primary | ICD-10-CM

## 2023-06-19 PROCEDURE — 99213 OFFICE O/P EST LOW 20 MIN: CPT | Performed by: INTERNAL MEDICINE

## 2023-06-19 SDOH — ECONOMIC STABILITY: INCOME INSECURITY: HOW HARD IS IT FOR YOU TO PAY FOR THE VERY BASICS LIKE FOOD, HOUSING, MEDICAL CARE, AND HEATING?: PATIENT DECLINED

## 2023-06-19 SDOH — ECONOMIC STABILITY: FOOD INSECURITY: WITHIN THE PAST 12 MONTHS, THE FOOD YOU BOUGHT JUST DIDN'T LAST AND YOU DIDN'T HAVE MONEY TO GET MORE.: PATIENT DECLINED

## 2023-06-19 SDOH — ECONOMIC STABILITY: FOOD INSECURITY: WITHIN THE PAST 12 MONTHS, YOU WORRIED THAT YOUR FOOD WOULD RUN OUT BEFORE YOU GOT MONEY TO BUY MORE.: PATIENT DECLINED

## 2023-06-19 SDOH — ECONOMIC STABILITY: HOUSING INSECURITY
IN THE LAST 12 MONTHS, WAS THERE A TIME WHEN YOU DID NOT HAVE A STEADY PLACE TO SLEEP OR SLEPT IN A SHELTER (INCLUDING NOW)?: PATIENT REFUSED

## 2023-06-19 SDOH — ECONOMIC STABILITY: TRANSPORTATION INSECURITY
IN THE PAST 12 MONTHS, HAS LACK OF TRANSPORTATION KEPT YOU FROM MEETINGS, WORK, OR FROM GETTING THINGS NEEDED FOR DAILY LIVING?: PATIENT DECLINED

## 2023-06-19 ASSESSMENT — ANXIETY QUESTIONNAIRES
GAD7 TOTAL SCORE: 0
2. NOT BEING ABLE TO STOP OR CONTROL WORRYING: 0
1. FEELING NERVOUS, ANXIOUS, OR ON EDGE: 0
IF YOU CHECKED OFF ANY PROBLEMS ON THIS QUESTIONNAIRE, HOW DIFFICULT HAVE THESE PROBLEMS MADE IT FOR YOU TO DO YOUR WORK, TAKE CARE OF THINGS AT HOME, OR GET ALONG WITH OTHER PEOPLE: NOT DIFFICULT AT ALL
6. BECOMING EASILY ANNOYED OR IRRITABLE: 0
3. WORRYING TOO MUCH ABOUT DIFFERENT THINGS: 0
5. BEING SO RESTLESS THAT IT IS HARD TO SIT STILL: 0
7. FEELING AFRAID AS IF SOMETHING AWFUL MIGHT HAPPEN: 0
4. TROUBLE RELAXING: 0

## 2023-06-19 ASSESSMENT — PATIENT HEALTH QUESTIONNAIRE - PHQ9
2. FEELING DOWN, DEPRESSED OR HOPELESS: 0
1. LITTLE INTEREST OR PLEASURE IN DOING THINGS: 0
SUM OF ALL RESPONSES TO PHQ QUESTIONS 1-9: 0
SUM OF ALL RESPONSES TO PHQ9 QUESTIONS 1 & 2: 0
SUM OF ALL RESPONSES TO PHQ QUESTIONS 1-9: 0

## 2023-06-19 ASSESSMENT — ENCOUNTER SYMPTOMS
SHORTNESS OF BREATH: 0
CHEST TIGHTNESS: 0
APNEA: 1
GASTROINTESTINAL NEGATIVE: 1
COUGH: 0

## 2023-06-19 NOTE — PROGRESS NOTES
Trevon Jhaveri (:  1975) is a Established patient, presenting virtually for evaluation of the following:    Assessment & Plan   Below is the assessment and plan developed based on review of pertinent history, physical exam, labs, studies, and medications. 1. Loud snoring  -     [facility], MD Janene, Sleep MedicineJassi ([address])  2. Body mass index (BMI) 45.0-49.9, adult ([address]  -     [facility] - Jacqui Galloway MD, Sleep MedicineJassi ([address])    Reviewed with patient medication side effects in detail   I answered all patient questions and concerns  Labs and testing done and/or upcoming labs/test were reviewed and discussed   Reviewed and discussed daily activity, exercise and diet    Return if symptoms worsen or fail to improve, for follow up for routine visit or before if needed. Subjective  patient was seen via video. Reports that she has had an increase in snoring over the last few months. Notes that her partner has now told her she stop breathing at certain times. Has been working on her weight but has not gotten this down. Denies daytime fatigue or cough. No family history of SHABBIR. Gets about 6 or more hours a night of sleep. Also notes an area to her lower legs that hurts on and off. Notes that I need to see this and will come in person   HPI  Review of Systems   Constitutional:  Negative for activity change, fatigue and fever. HENT:  Negative for congestion and ear pain. Respiratory:  Positive for apnea. Negative for cough, chest tightness and shortness of breath. Cardiovascular: Negative. Gastrointestinal: Negative. Musculoskeletal: Negative. Neurological: Negative.          Objective   Patient-Reported Vitals  Patient-Reported Systolic (Top): 673 mmHg  Patient-Reported Diastolic (Bottom): 976 mmHg  BP Observations: Yes, BP was taken on electronic monitoring device with digital readout  Patient-Reported Weight: 303lbs  Patient-Reported Height: 5ft7in       Physical

## 2023-06-19 NOTE — PROGRESS NOTES
1. \"Have you been to the ER, urgent care clinic since your last visit? Hospitalized since your last visit? \" No    2. \"Have you seen or consulted any other health care providers outside of the [address] [place] [address] since your last visit? \" No    3. For patients aged 39-70: Has the patient had a colonoscopy / FIT/ Cologuard? Recommendation: Colonoscopy every 10y or annual FIT test from 50-75 or every 3 year stool DNA based test with consideration of ongoing screening from 76-85. and Pt did the cologuard      If the patient is female:    4. For patients aged 41-77: Has the patient had a mammogram within the past 2 years? No    5. For patients aged 21-65: Has the patient had a pap smear?  Yes  [facility].

## 2023-08-22 ENCOUNTER — OFFICE VISIT (OUTPATIENT)
Age: 48
End: 2023-08-22
Payer: COMMERCIAL

## 2023-08-22 VITALS
BODY MASS INDEX: 45.99 KG/M2 | HEART RATE: 88 BPM | OXYGEN SATURATION: 98 % | DIASTOLIC BLOOD PRESSURE: 81 MMHG | HEIGHT: 67 IN | WEIGHT: 293 LBS | SYSTOLIC BLOOD PRESSURE: 132 MMHG

## 2023-08-22 DIAGNOSIS — E66.01 MORBID OBESITY WITH BMI OF 45.0-49.9, ADULT (HCC): ICD-10-CM

## 2023-08-22 DIAGNOSIS — G47.33 OSA (OBSTRUCTIVE SLEEP APNEA): Primary | ICD-10-CM

## 2023-08-22 PROCEDURE — 99204 OFFICE O/P NEW MOD 45 MIN: CPT | Performed by: SPECIALIST

## 2023-08-22 ASSESSMENT — SLEEP AND FATIGUE QUESTIONNAIRES
FOSQ SCORE: 15.5
HOW LIKELY ARE YOU TO NOD OFF OR FALL ASLEEP WHILE SITTING AND TALKING TO SOMEONE: SLIGHT CHANCE OF DOZING
HOW LIKELY ARE YOU TO NOD OFF OR FALL ASLEEP WHILE LYING DOWN TO REST IN THE AFTERNOON WHEN CIRCUMSTANCES PERMIT: MODERATE CHANCE OF DOZING
HOW LIKELY ARE YOU TO NOD OFF OR FALL ASLEEP WHILE SITTING INACTIVE IN A PUBLIC PLACE: SLIGHT CHANCE OF DOZING
HOW LIKELY ARE YOU TO NOD OFF OR FALL ASLEEP WHILE SITTING INACTIVE IN A PUBLIC PLACE: 1
NECK CIRCUMFERENCE (INCHES): 16.5
SELECT ANY OF THE FOLLOWING BEHAVIORS OBSERVED WHILE YOU ARE ASLEEP: GRINDING TEETH
ARE YOU BOTHERED BY WAKING UP TOO EARLY AND NOT BEING ABLE TO GET BACK TO SLEEP: IS
DO YOU GET TOO LITTLE SLEEP AT NIGHT: DOES
HAS YOUR MOOD BEEN AFFECTED BECAUSE YOU ARE SLEEPY OR TIRED: YES, MODERATE
HOW LIKELY ARE YOU TO NOD OFF OR FALL ASLEEP WHEN YOU ARE A PASSENGER IN A CAR FOR AN HOUR WITHOUT A BREAK: 1
HAS YOUR RELATIONSHIP WITH FAMILY, FRIENDS OR WORK COLLEAGUES BEEN AFFECTED BECAUSE YOU ARE SLEEPY OR TIRED: NO
HOW LIKELY ARE YOU TO NOD OFF OR FALL ASLEEP IN A CAR, WHILE STOPPED FOR A FEW MINUTES IN TRAFFIC: WOULD NEVER DOZE
AVERAGE NUMBER OF SLEEP HOURS: 5
DO YOU HAVE DIFFICULTY CONCENTRATING ON THE THINGS YOU DO BECAUSE YOU ARE SLEEPY OR TIRED: YES, A LITTLE
DO YOU HAVE DIFFICULTY BEING AS ACTIVE AS YOU WANT TO BE IN THE MORNING BECAUSE YOU ARE SLEEPY OR TIRED: YES, MODERATE
DO YOU HAVE DIFFICULTY OPERATING A MOTOR VEHICLE FOR LONG DISTANCES (GREATER THAN 100 MILES) BECAUSE YOU BECOME SLEEPY: YES, A LITTLE
WHAT TIME DO YOU USUALLY GO TO BED: 22:00
HOW LIKELY ARE YOU TO NOD OFF OR FALL ASLEEP WHEN YOU ARE A PASSENGER IN A CAR FOR AN HOUR WITHOUT A BREAK: SLIGHT CHANCE OF DOZING
HOW LIKELY ARE YOU TO NOD OFF OR FALL ASLEEP WHILE WATCHING TV: SLIGHT CHANCE OF DOZING
DO YOU HAVE DIFFICULTY OPERATING A MOTOR VEHICLE FOR SHORT DISTANCES (LESS THAN 100 MILES) BECAUSE YOU BECOME SLEEPY: NO
ARE YOU BOTHERED BY WAKING UP TOO EARLY AND NOT BEING ABLE TO GET BACK TO SLEEP: YES
DO YOU HAVE DIFFICULTY VISITING YOUR FAMILY OR FRIENDS IN THEIR HOME BECAUSE YOU BECOME SLEEPY OR TIRED: NO
HOW LIKELY ARE YOU TO NOD OFF OR FALL ASLEEP IN A CAR, WHILE STOPPED FOR A FEW MINUTES IN TRAFFIC: 0
HOW LIKELY ARE YOU TO NOD OFF OR FALL ASLEEP WHILE SITTING AND READING: MODERATE CHANCE OF DOZING
SELECT ANY OF THE FOLLOWING BEHAVIORS OBSERVED WHILE YOU ARE ASLEEP: KICKING WITH LEGS
NUMBER OF TIMES YOU WAKE PER NIGHT: 2
DO YOU TAKE NAPS: NO
HOW LIKELY ARE YOU TO NOD OFF OR FALL ASLEEP WHILE WATCHING TV: 1
HOW LIKELY ARE YOU TO NOD OFF OR FALL ASLEEP WHILE SITTING QUIETLY AFTER LUNCH WITHOUT ALCOHOL: 2
DO YOU HAVE DIFFICULTY BEING AS ACTIVE AS YOU WANT TO BE IN THE EVENING BECAUSE YOU ARE SLEEPY OR TIRED: YES, MODERATE
ESS TOTAL SCORE: 10
HOW LIKELY ARE YOU TO NOD OFF OR FALL ASLEEP WHILE SITTING AND READING: 2
DO YOU HAVE PROBLEMS WITH FREQUENT AWAKENINGS AT NIGHT: YES
HOW LIKELY ARE YOU TO NOD OFF OR FALL ASLEEP WHILE SITTING QUIETLY AFTER LUNCH WITHOUT ALCOHOL: MODERATE CHANCE OF DOZING
DO YOU GET TOO LITTLE SLEEP AT NIGHT: YES
DO YOU WORK SHIFTS: NO
SELECT ANY OF THE FOLLOWING BEHAVIORS OBSERVED WHILE YOU ARE ASLEEP: LOUD SNORING
DO YOU HAVE DIFFICULTY WATCHING A MOVIE OR VIDEO BECAUSE YOU BECOME SLEEPY OR TIRED: YES, A LITTLE
SELECT ANY OF THE FOLLOWING BEHAVIORS OBSERVED WHILE YOU ARE ASLEEP: SLEEP TALKING
HOW LIKELY ARE YOU TO NOD OFF OR FALL ASLEEP WHILE LYING DOWN TO REST IN THE AFTERNOON WHEN CIRCUMSTANCES PERMIT: 2
AVERAGE NUMBER OF SLEEP HOURS: 5
SELECT ANY OF THE FOLLOWING BEHAVIORS OBSERVED WHILE YOU ARE ASLEEP: PAUSES IN BREATHING
DO YOU GENERALLY HAVE DIFFICULTY REMEMBERING THINGS BECAUSE YOU ARE SLEEPY OR TIRED: NO
HOW LIKELY ARE YOU TO NOD OFF OR FALL ASLEEP WHILE SITTING AND TALKING TO SOMEONE: 1

## 2023-08-22 NOTE — PROGRESS NOTES
[facility] - [facility]  [facility]  [address] [place] [address] Drive [address]  Dept: [phone] [address] [place], [address]  Tel.  [phone]  Fax. [address] [facility], [address] [facility]  Tel.  [phone]  Fax. [phone] [facility], [address] [facility]  Tel.  [phone]  Fax. [phone]       Chief Complaint       Chief Complaint   Patient presents with    Sleep Problem     NP refd by KAROLINE Heard - NP for snoring; pauses in breathing       HPI      Shravan Irene is 56 y.o. adult seen for evaluation of a sleep disorder. Snoring described as loud, heard through close doors. Associated with apparent apnea. Currently retires at 10 PM and will get a bed at 6 AM.  May awaken 2-4 times during the night. Describes herself as tired on awakening. May experience frequent dreams at times. Has been told of apparent arm/leg movements. Notes sleep talking, apparent bruxism. May doze if seated and inactive such as when reading, seated quietly after lunch. The patient has not undergone diagnostic testing for the current problems. Sleep Medicine 8/22/2023   Sitting and reading 2   Watching TV 1   Sitting, inactive in a public place (e.g. a theatre or a meeting) 1   As a passenger in a car for an hour without a break 1   Lying down to rest in the afternoon when circumstances permit 2   Sitting and talking to someone 1   Sitting quietly after a lunch without alcohol 2   In a car, while stopped for a few minutes in traffic 0   [place] Sleepiness Score 10   Neck circumference (Inches) 16.5        No Known Allergies    No current outpatient medications on file. Shravan Irene  has a past medical history of GERD (gastroesophageal reflux disease), Headache, Low back pain, Obesity, and Sinus congestion.     Shravan Irene  has a past surgical history that includes orthopedic

## 2023-09-18 ENCOUNTER — PROCEDURE VISIT (OUTPATIENT)
Age: 48
End: 2023-09-18

## 2023-09-18 DIAGNOSIS — G47.33 OSA (OBSTRUCTIVE SLEEP APNEA): Primary | ICD-10-CM

## 2023-09-27 NOTE — PROGRESS NOTES
[address] [facility].[place], [address]  Tel.  [phone]  Fax. [address] [facility], [address] [place] [address]  Tel.  [phone]  Fax. [phone] [address]  Tel.  [phone]  Fax. [phone]       Hermann Blanc is a 56 y.o. adult seen today to receive a home sleep testing unit (HST). Patient was educated on proper hookup and operation of the HST. Instruction forms and documentation were reviewed and signed. The patient demonstrated good understanding of the HST.    O>    There were no vitals taken for this visit. A>  No diagnosis found. P>  General information regarding operations and maintenance of the device was provided. Michael Cordova was provided information on sleep apnea including coresponding risk factors and the importance of proper treatment. Follow-up appointment was made to return the HST. Michael Cordova will be contacted once the results have been reviewed. Michael Cordova was asked to contact our office for any problems regarding Belen Morgan's home sleep test study.

## 2023-09-29 ENCOUNTER — TELEPHONE (OUTPATIENT)
Age: 48
End: 2023-09-29

## 2023-09-29 DIAGNOSIS — G47.33 OSA (OBSTRUCTIVE SLEEP APNEA): Primary | ICD-10-CM

## 2023-09-29 NOTE — TELEPHONE ENCOUNTER
WatchPAT HSAT performed for potential sleep disordered breathing. 7 hours 38 minutes recorded of which 6 hours 24 minutes spent asleep; 27.5% of sleep and REM. All sleep stages observed. Patient supine and lateral.    Overall AHI 4.2/h (4% desaturation definition). AHI 10.5/h (3% desaturation definition) with a REM related AHI 31.1/h. Minimal SaO2 84%. Impression: Sleep disordered breathing more prominent in REM sleep associated with arterial desaturation. Recommendation: APAP 7-17 cm    Sleep technologist: Please review HSAT results with the patient. Order has been entered for APAP 7-17 cm. Please schedule first compliance appointment.

## 2023-10-02 ENCOUNTER — CLINICAL DOCUMENTATION (OUTPATIENT)
Age: 48
End: 2023-10-02

## 2023-11-02 ENCOUNTER — OFFICE VISIT (OUTPATIENT)
Age: 48
End: 2023-11-02

## 2023-11-02 VITALS
OXYGEN SATURATION: 96 % | HEART RATE: 92 BPM | BODY MASS INDEX: 45.99 KG/M2 | HEIGHT: 67 IN | WEIGHT: 293 LBS | SYSTOLIC BLOOD PRESSURE: 145 MMHG | TEMPERATURE: 97.9 F | RESPIRATION RATE: 20 BRPM | DIASTOLIC BLOOD PRESSURE: 98 MMHG

## 2023-11-02 DIAGNOSIS — L02.31 ABSCESS OF GLUTEAL REGION: Primary | ICD-10-CM

## 2023-11-02 RX ORDER — CEPHALEXIN 500 MG/1
500 CAPSULE ORAL 3 TIMES DAILY
Qty: 21 CAPSULE | Refills: 0 | Status: SHIPPED | OUTPATIENT
Start: 2023-11-02 | End: 2023-11-09

## 2023-11-02 NOTE — PATIENT INSTRUCTIONS
Please follow up with your PCP within 2 days if your signs and symptoms have not resolved or worsened. Please go immediately to the ED if you develop fever of 100.4F or above, chills, vomiting, worsening redness/pain/swelling to affected area, red streaks, and/or no improvement after 48 hours of oral antibiotic therapy. Take oral antibiotics on a full stomach until you complete the entire prescription. Take a probiotic while you are using the antibiotic. SKIN INFECTION TREATMENT:  Cellulitis treatment includes antibiotics as well as treatment of any underlying condition that led to the skin infection. Elevate the area - elevating the arm or leg above the level of the heart can help to reduce swelling and speed healing. Keep the area clean and dry - it is important to keep the infected area clean and dry. You can shower or bathe normally and pat the area dry with a clean towel. You can use a bandage or gauze to protect the skin if needed. Antibiotics - Most people with cellulitis are treated with an antibiotic that is taken by mouth for 5 to 14 days. It is important to take the antibiotic exactly as recommended and to finish the entire course of treatment. Skipping doses or ending treatment early could potentially allow the bacteria to become resistant and require longer treatment or permit the infection to worse after initial improvement. Time to heal - Resolution of fever and chills, if they were initially present, should occur within one to two days after starting antibiotic therapy. Local findings of swelling, warmth, and redness should begin to improve within one to three days after starting antibiotics, although these symptoms can persist for two weeks. If the reddened area becomes larger, more swollen, or more tender, call your healthcare provider. He or she may want to re-examine you to determine if further testing or an alternate antibiotic is needed.

## 2023-11-02 NOTE — PROGRESS NOTES
distress    The exam did not reveal an ill appearing patient, a toxic-appearing patient, vital signs that were significantly abnormal, a decreased O2 saturation or respiratory distress. The exam did not reveal dehydration, altered mental status, listlessness or lethargy. Patient presents with an exam consistent with cellulitis. The patient does not have evidence for a more malignant process at this time. The patient does not have evidence for abscess or underlying foreign body. Patient does not have signs of systemic illness or sepsis. No indications of necrotizing infection, no pain out of proportion or signs of deeper tissue infection. The patient appears to be a good candidate for outpatient therapy at this time based on generally favorable clinical appearance and ability to tolerate PO medications. Patient will be treated on an outpatient basis with oral antibiotics. Appropriate antibiotics have been provided, with selection based on likely pathogens. -Keflex  prescribed  -Educational material and patient instructions provided  -Advised patient to immediately go to the ER if systemic signs of toxicity should arise, rapid progression of erythema, and/or progression of clinical findings after 48 hours of oral antibiotic therapy. I have discussed the results, diagnosis and treatment plan with the patient. The patient also understands that early in the process of an illness, an urgent care workup can be falsely reassuring. Routine discharge counseling and specific return precautions discussed with patient and the patient understands that worsening, changing or persistent symptoms should prompt an immediate return to the urgent care or emergency department. Patient/Guardian expressed understanding and agrees with the discharge plan. No further questions at time of discharge.     Severiano Silvers, APRN - CNP

## 2023-11-06 ASSESSMENT — ENCOUNTER SYMPTOMS: COLOR CHANGE: 1

## 2024-09-18 ENCOUNTER — OFFICE VISIT (OUTPATIENT)
Age: 49
End: 2024-09-18
Payer: COMMERCIAL

## 2024-09-18 VITALS
TEMPERATURE: 98 F | WEIGHT: 293 LBS | SYSTOLIC BLOOD PRESSURE: 134 MMHG | HEIGHT: 67 IN | RESPIRATION RATE: 18 BRPM | DIASTOLIC BLOOD PRESSURE: 80 MMHG | BODY MASS INDEX: 45.99 KG/M2 | OXYGEN SATURATION: 98 %

## 2024-09-18 DIAGNOSIS — R68.89 LIGHT SENSITIVITY: ICD-10-CM

## 2024-09-18 DIAGNOSIS — G47.33 OSA (OBSTRUCTIVE SLEEP APNEA): ICD-10-CM

## 2024-09-18 DIAGNOSIS — R51.9 GENERALIZED HEADACHE: ICD-10-CM

## 2024-09-18 DIAGNOSIS — R03.0 ELEVATED BLOOD PRESSURE READING: Primary | ICD-10-CM

## 2024-09-18 PROCEDURE — 99214 OFFICE O/P EST MOD 30 MIN: CPT | Performed by: INTERNAL MEDICINE

## 2024-09-18 RX ORDER — TERBINAFINE HYDROCHLORIDE 250 MG/1
250 TABLET ORAL DAILY
COMMUNITY
Start: 2024-07-11

## 2024-09-18 RX ORDER — LOSARTAN POTASSIUM 50 MG/1
50 TABLET ORAL DAILY
COMMUNITY
Start: 2024-09-16

## 2024-09-18 RX ORDER — FAMOTIDINE 20 MG/1
20 TABLET, FILM COATED ORAL 2 TIMES DAILY
COMMUNITY
Start: 2024-09-17

## 2024-09-18 SDOH — ECONOMIC STABILITY: FOOD INSECURITY: WITHIN THE PAST 12 MONTHS, THE FOOD YOU BOUGHT JUST DIDN'T LAST AND YOU DIDN'T HAVE MONEY TO GET MORE.: NEVER TRUE

## 2024-09-18 SDOH — ECONOMIC STABILITY: FOOD INSECURITY: WITHIN THE PAST 12 MONTHS, YOU WORRIED THAT YOUR FOOD WOULD RUN OUT BEFORE YOU GOT MONEY TO BUY MORE.: NEVER TRUE

## 2024-09-18 SDOH — ECONOMIC STABILITY: INCOME INSECURITY: HOW HARD IS IT FOR YOU TO PAY FOR THE VERY BASICS LIKE FOOD, HOUSING, MEDICAL CARE, AND HEATING?: NOT HARD AT ALL

## 2024-09-18 ASSESSMENT — PATIENT HEALTH QUESTIONNAIRE - PHQ9
2. FEELING DOWN, DEPRESSED OR HOPELESS: NOT AT ALL
SUM OF ALL RESPONSES TO PHQ QUESTIONS 1-9: 0
1. LITTLE INTEREST OR PLEASURE IN DOING THINGS: NOT AT ALL
SUM OF ALL RESPONSES TO PHQ9 QUESTIONS 1 & 2: 0

## 2024-09-18 ASSESSMENT — ENCOUNTER SYMPTOMS
GASTROINTESTINAL NEGATIVE: 1
SHORTNESS OF BREATH: 0
COUGH: 0
PHOTOPHOBIA: 1

## 2024-10-31 ENCOUNTER — TELEMEDICINE (OUTPATIENT)
Age: 49
End: 2024-10-31
Payer: COMMERCIAL

## 2024-10-31 ENCOUNTER — TELEPHONE (OUTPATIENT)
Age: 49
End: 2024-10-31

## 2024-10-31 DIAGNOSIS — S09.90XA INJURY OF HEAD, INITIAL ENCOUNTER: Primary | ICD-10-CM

## 2024-10-31 DIAGNOSIS — S06.0X0A CONCUSSION WITHOUT LOSS OF CONSCIOUSNESS, INITIAL ENCOUNTER: ICD-10-CM

## 2024-10-31 DIAGNOSIS — R68.89 LIGHT SENSITIVITY: ICD-10-CM

## 2024-10-31 DIAGNOSIS — M54.2 NECK PAIN, ACUTE: ICD-10-CM

## 2024-10-31 PROCEDURE — 99214 OFFICE O/P EST MOD 30 MIN: CPT | Performed by: INTERNAL MEDICINE

## 2024-10-31 RX ORDER — TRIAMCINOLONE ACETONIDE 55 UG/1
1 SPRAY, METERED NASAL DAILY
COMMUNITY
Start: 2024-09-18

## 2024-10-31 ASSESSMENT — ENCOUNTER SYMPTOMS: RESPIRATORY NEGATIVE: 1

## 2024-10-31 NOTE — TELEPHONE ENCOUNTER
Pt needs a letter to return to work on Nov 6. Pt states she can work from home Monday and Tuesday but does not want to return to the office until wed Nov 6.  Please send letter to patients my chart

## 2024-10-31 NOTE — PROGRESS NOTES
10/31/2024    TELEHEALTH EVALUATION -- Audio/Visual    HPI:    Yamila Morgan (:  1975) has requested an audio/video evaluation for the following concern(s):      History of Present Illness  The patient presents via virtual visit for evaluation of a head injury.    She reports that on 10/29/2024 at approximately 10 PM, an iron fell from her closet and struck the top of her head. Since the incident, she has experienced sensitivity to light, unusual sensations in her head, and headaches. She has taken Tylenol for relief, which has provided some benefit. She also spent most of the following day sleeping.    She experiences neck pain, which she attributes to the impact of the iron. She finds it difficult to focus for extended periods.    She reports no nausea, vomiting, or loss of consciousness. She sought help from [facility]     Review of Systems   Constitutional: Negative.    Eyes:  Positive for visual disturbance.   Respiratory: Negative.     Cardiovascular: Negative.    Musculoskeletal:  Positive for neck pain.   Neurological:  Positive for headaches.       Prior to Visit Medications    Medication Sig Taking? Authorizing Provider   triamcinolone (NASACORT) 55 MCG/ACT nasal inhaler 1 spray by Nasal route daily Yes Nacho Zavala MD   losartan (COZAAR) 50 MG tablet Take 1 tablet by mouth daily  Patient not taking: Reported on 10/31/2024  Nacho Zavala MD   terbinafine (LAMISIL) 250 MG tablet Take 1 tablet by mouth daily  Patient not taking: Reported on 10/31/2024  Nacho Zavala MD       Social History     Tobacco Use    Smoking status: Never    Smokeless tobacco: Never   Vaping Use    Vaping status: Never Used   Substance Use Topics    Alcohol use: Yes     Comment: socially    Drug use: No        No Known Allergies,   Past Medical History:   Diagnosis Date    GERD (gastroesophageal reflux disease)     Headache     Low back pain     Obesity     Sinus congestion    ,   Past Surgical

## 2024-10-31 NOTE — PROGRESS NOTES
Chief Complaint   Patient presents with    Headache    Fall     \"Have you been to the ER, urgent care clinic since your last visit?  Hospitalized since your last visit?\"    Urgentcare  Headache/fall  Last week    “Have you seen or consulted any other health care providers outside our system since your last visit?”    NO    Have you had a mammogram?”   NO    No breast cancer screening on file      “Have you had a pap smear?”    yes    No cervical cancer screening on file

## 2024-12-02 ENCOUNTER — OFFICE VISIT (OUTPATIENT)
Age: 49
End: 2024-12-02
Payer: COMMERCIAL

## 2024-12-02 VITALS
SYSTOLIC BLOOD PRESSURE: 130 MMHG | OXYGEN SATURATION: 98 % | BODY MASS INDEX: 45.99 KG/M2 | DIASTOLIC BLOOD PRESSURE: 83 MMHG | WEIGHT: 293 LBS | TEMPERATURE: 98 F | HEART RATE: 91 BPM | HEIGHT: 67 IN

## 2024-12-02 DIAGNOSIS — G47.33 OSA (OBSTRUCTIVE SLEEP APNEA): Primary | ICD-10-CM

## 2024-12-02 PROCEDURE — 99213 OFFICE O/P EST LOW 20 MIN: CPT | Performed by: SPECIALIST

## 2024-12-02 RX ORDER — AMMONIUM LACTATE 12 G/100G
CREAM TOPICAL
COMMUNITY
Start: 2024-11-04

## 2024-12-02 ASSESSMENT — SLEEP AND FATIGUE QUESTIONNAIRES
HOW LIKELY ARE YOU TO NOD OFF OR FALL ASLEEP IN A CAR, WHILE STOPPED FOR A FEW MINUTES IN TRAFFIC: WOULD NEVER DOZE
HOW LIKELY ARE YOU TO NOD OFF OR FALL ASLEEP WHEN YOU ARE A PASSENGER IN A CAR FOR AN HOUR WITHOUT A BREAK: WOULD NEVER DOZE
HOW LIKELY ARE YOU TO NOD OFF OR FALL ASLEEP WHILE SITTING INACTIVE IN A PUBLIC PLACE: WOULD NEVER DOZE
DO YOU HAVE DIFFICULTY BEING AS ACTIVE AS YOU WANT TO BE IN THE EVENING BECAUSE YOU ARE SLEEPY OR TIRED: YES, MODERATE
HOW LIKELY ARE YOU TO NOD OFF OR FALL ASLEEP WHEN YOU ARE A PASSENGER IN A CAR FOR AN HOUR WITHOUT A BREAK: WOULD NEVER DOZE
DO YOU HAVE DIFFICULTY WATCHING A MOVIE OR VIDEO BECAUSE YOU BECOME SLEEPY OR TIRED: NO
DO YOU HAVE DIFFICULTY OPERATING A MOTOR VEHICLE FOR SHORT DISTANCES (LESS THAN 100 MILES) BECAUSE YOU BECOME SLEEPY: NO
HOW LIKELY ARE YOU TO NOD OFF OR FALL ASLEEP WHILE SITTING QUIETLY AFTER LUNCH WITHOUT ALCOHOL: WOULD NEVER DOZE
FOSQ SCORE: 16.5
HOW LIKELY ARE YOU TO NOD OFF OR FALL ASLEEP WHILE WATCHING TV: SLIGHT CHANCE OF DOZING
DO YOU HAVE DIFFICULTY CONCENTRATING ON THE THINGS YOU DO BECAUSE YOU ARE SLEEPY OR TIRED: YES, A LITTLE
HOW LIKELY ARE YOU TO NOD OFF OR FALL ASLEEP WHILE SITTING INACTIVE IN A PUBLIC PLACE: WOULD NEVER DOZE
HOW LIKELY ARE YOU TO NOD OFF OR FALL ASLEEP IN A CAR, WHILE STOPPED FOR A FEW MINUTES IN TRAFFIC: WOULD NEVER DOZE
HOW LIKELY ARE YOU TO NOD OFF OR FALL ASLEEP WHILE SITTING AND TALKING TO SOMEONE: WOULD NEVER DOZE
HOW LIKELY ARE YOU TO NOD OFF OR FALL ASLEEP WHILE SITTING AND READING: SLIGHT CHANCE OF DOZING
DO YOU HAVE DIFFICULTY BEING AS ACTIVE AS YOU WANT TO BE IN THE MORNING BECAUSE YOU ARE SLEEPY OR TIRED: YES, MODERATE
DO YOU HAVE DIFFICULTY VISITING YOUR FAMILY OR FRIENDS IN THEIR HOME BECAUSE YOU BECOME SLEEPY OR TIRED: NO
HOW LIKELY ARE YOU TO NOD OFF OR FALL ASLEEP WHILE SITTING AND READING: SLIGHT CHANCE OF DOZING
HAS YOUR RELATIONSHIP WITH FAMILY, FRIENDS OR WORK COLLEAGUES BEEN AFFECTED BECAUSE YOU ARE SLEEPY OR TIRED: YES, A LITTLE
HAS YOUR MOOD BEEN AFFECTED BECAUSE YOU ARE SLEEPY OR TIRED: YES, LITTLE
HOW LIKELY ARE YOU TO NOD OFF OR FALL ASLEEP WHILE LYING DOWN TO REST IN THE AFTERNOON WHEN CIRCUMSTANCES PERMIT: SLIGHT CHANCE OF DOZING
HOW LIKELY ARE YOU TO NOD OFF OR FALL ASLEEP WHILE SITTING QUIETLY AFTER LUNCH WITHOUT ALCOHOL: WOULD NEVER DOZE
HOW LIKELY ARE YOU TO NOD OFF OR FALL ASLEEP WHILE SITTING AND TALKING TO SOMEONE: WOULD NEVER DOZE
HOW LIKELY ARE YOU TO NOD OFF OR FALL ASLEEP WHILE LYING DOWN TO REST IN THE AFTERNOON WHEN CIRCUMSTANCES PERMIT: SLIGHT CHANCE OF DOZING
HOW LIKELY ARE YOU TO NOD OFF OR FALL ASLEEP WHILE WATCHING TV: SLIGHT CHANCE OF DOZING
ESS TOTAL SCORE: 3
DO YOU HAVE DIFFICULTY OPERATING A MOTOR VEHICLE FOR LONG DISTANCES (GREATER THAN 100 MILES) BECAUSE YOU BECOME SLEEPY: NO
DO YOU GENERALLY HAVE DIFFICULTY REMEMBERING THINGS BECAUSE YOU ARE SLEEPY OR TIRED: NO

## 2024-12-02 NOTE — PROGRESS NOTES
[facility] - [address]  [facility] - [facility]  [address]  [facility] [address]  Dept: [phone]              [address]  [place] [address]  Tel.  [phone]  Fax. [phone] [address]  [place] [address]  Tel.  [phone]  Fax. [phone] [address] [place] [address]  Tel.  [phone]  Fax. [phone]         Chief Complaint       Chief Complaint   Patient presents with    Sleep Problem     PAP follow up. Having problem using device on regular basis due to sinus issues.          HPI        Yamila Morgan is a 49 y.o. adult seen for follow-up. She was evaluated with a home  sleep study which demonstrated sleep disordered breathing characterized by an overall AHI 4.2/h (4% desaturation definition). AHI 10.5/h (3% desaturation definition) with a REM related AHI 31.1/h. Minimal SaO2 84%.     APAP initiated 7 to 17 cm.  Device set up date: 12/8/2023    Compliance data downloaded and reviewed in detail with the patient today. During the past 30 days, APAP used during 30 days with the average daily use of 5.95 hours. CMS compliance criteria 93%. AHI 0.3 per hour.     No Known Allergies    No current facility-administered medications for this visit.     Yamila  has a past medical history of GERD (gastroesophageal reflux disease), Headache, Low back pain, Obesity, and Sinus congestion.    Yamila  has a past surgical history that includes orthopedic surgery and Tonsillectomy.    Yamila family history includes Asthma in Yamila's mother.    Yamila  reports that Yamila has never smoked. Yamila has never used smokeless tobacco. Yamila reports current alcohol use of about 1.0 standard drink of alcohol per week. Yamila reports that Yamila does not use drugs.     Review of Systems:  Unchanged per patient      Objective:   /83 (Site: Right Lower Arm, Position: Sitting, Cuff Size: Large Adult)   Pulse 91   Temp 98 °F (36.7

## 2024-12-02 NOTE — PROGRESS NOTES
Chief Complaint   Patient presents with    Sleep Problem     PAP follow up. Having problem using device on regular basis due to sinus issues.

## 2024-12-06 ENCOUNTER — CLINICAL DOCUMENTATION (OUTPATIENT)
Age: 49
End: 2024-12-06

## 2025-01-14 ENCOUNTER — OFFICE VISIT (OUTPATIENT)
Age: 50
End: 2025-01-14

## 2025-01-14 VITALS
HEART RATE: 64 BPM | WEIGHT: 293 LBS | DIASTOLIC BLOOD PRESSURE: 86 MMHG | HEIGHT: 67 IN | SYSTOLIC BLOOD PRESSURE: 118 MMHG | BODY MASS INDEX: 45.99 KG/M2 | OXYGEN SATURATION: 97 %

## 2025-01-14 SDOH — ECONOMIC STABILITY: TRANSPORTATION INSECURITY
IN THE PAST 12 MONTHS, HAS THE LACK OF TRANSPORTATION KEPT YOU FROM MEDICAL APPOINTMENTS OR FROM GETTING MEDICATIONS?: NO

## 2025-01-14 SDOH — ECONOMIC STABILITY: FOOD INSECURITY: WITHIN THE PAST 12 MONTHS, YOU WORRIED THAT YOUR FOOD WOULD RUN OUT BEFORE YOU GOT MONEY TO BUY MORE.: NEVER TRUE

## 2025-01-14 SDOH — ECONOMIC STABILITY: INCOME INSECURITY: IN THE LAST 12 MONTHS, WAS THERE A TIME WHEN YOU WERE NOT ABLE TO PAY THE MORTGAGE OR RENT ON TIME?: NO

## 2025-01-14 SDOH — ECONOMIC STABILITY: FOOD INSECURITY: WITHIN THE PAST 12 MONTHS, THE FOOD YOU BOUGHT JUST DIDN'T LAST AND YOU DIDN'T HAVE MONEY TO GET MORE.: NEVER TRUE

## 2025-01-14 SDOH — ECONOMIC STABILITY: TRANSPORTATION INSECURITY
IN THE PAST 12 MONTHS, HAS LACK OF TRANSPORTATION KEPT YOU FROM MEETINGS, WORK, OR FROM GETTING THINGS NEEDED FOR DAILY LIVING?: NO

## 2025-01-14 ASSESSMENT — PATIENT HEALTH QUESTIONNAIRE - PHQ9
SUM OF ALL RESPONSES TO PHQ QUESTIONS 1-9: 0
SUM OF ALL RESPONSES TO PHQ9 QUESTIONS 1 & 2: 0
SUM OF ALL RESPONSES TO PHQ QUESTIONS 1-9: 0
1. LITTLE INTEREST OR PLEASURE IN DOING THINGS: NOT AT ALL
2. FEELING DOWN, DEPRESSED OR HOPELESS: NOT AT ALL
SUM OF ALL RESPONSES TO PHQ QUESTIONS 1-9: 0
SUM OF ALL RESPONSES TO PHQ QUESTIONS 1-9: 0

## 2025-01-14 ASSESSMENT — ENCOUNTER SYMPTOMS
GASTROINTESTINAL NEGATIVE: 1
RESPIRATORY NEGATIVE: 1

## 2025-01-14 NOTE — PROGRESS NOTES
Chief Complaint   Patient presents with    Discuss Medications     \"Have you been to the ER, urgent care clinic since your last visit?  Hospitalized since your last visit?\"    NO    “Have you seen or consulted any other health care providers outside our system since your last visit?”    NO    Have you had a mammogram?”   NO    No breast cancer screening on file      “Have you had a pap smear?”    YES - Where: [facility] Nurse/JORJE to request most recent records if not in the chart    No cervical cancer screening on file

## 2025-01-14 NOTE — PROGRESS NOTES
Subjective    Yamila Morgan is a 49 y.o. adult who presents today for the following:  Chief Complaint   Patient presents with    Discuss Medications       History of Present Illness  The patient presents via virtual visit for weight loss.    She is seeking information regarding semaglutide as a potential treatment option for her weight loss journey. She has not previously utilized any pharmacological interventions for weight management. She reports no personal or familial history of thyroid disorders, medullary cancers, gastroparesis, diverticulitis, colon-related surgeries, or pancreatitis. She is not currently pregnant. Over the past 4 weeks, she has incorporated physical exercise into her routine, engaging in workouts at least twice a week. Additionally, she has initiated supplemental drips, including vitamin B12, and has explored a weight loss program offered by [facility].    FAMILY HISTORY  She does not have a family history of thyroid, medullary cancers, gastroparesis, diverticulitis, colon-related surgeries, or pancreatitis.        PMH/PSH/Allergies/Social History/medication list and most recent studies reviewed with patient.     reports that Yamila has never smoked. Yamila has never used smokeless tobacco.    reports current alcohol use of about 1.0 standard drink of alcohol per week.   Results       Vitals:    01/14/25 1316   BP: 118/86   Pulse: 64   SpO2: 97%     Body mass index is 49.49 kg/m².      1/14/2025     1:16 PM 12/2/2024     9:36 AM 9/18/2024     2:21 PM 12/22/2023     9:56 AM 11/2/2023    12:24 PM 8/22/2023     3:14 PM 3/30/2023     8:32 AM   Weight Metrics   Weight 316 lb 312 lb 8 oz 310 lb 320 lb 320 lb 9.6 oz 314 lb 9.6 oz 303 lb   BMI (Calculated) 49.6 kg/m2 49 kg/m2 48.7 kg/m2 50.2 kg/m2 50.3 kg/m2 49.4 kg/m2 47.6 kg/m2       Past Medical History:   Diagnosis Date    GERD (gastroesophageal reflux disease)     Headache     Low back pain     Obesity     Sinus congestion     Sleep apnea        No

## 2025-02-04 ENCOUNTER — TELEPHONE (OUTPATIENT)
Age: 50
End: 2025-02-04

## 2025-02-04 NOTE — TELEPHONE ENCOUNTER
Called patient back regarding previous encounter. Patient states in order to get injectables [facility] is asking for patient to get labs done which are BMP, fasting insulin, LDH, and A1c.

## 2025-02-04 NOTE — TELEPHONE ENCOUNTER
Patient called requesting a call back about getting lab orders. Her call back number is [phone]

## 2025-02-27 ENCOUNTER — OFFICE VISIT (OUTPATIENT)
Age: 50
End: 2025-02-27

## 2025-02-27 VITALS
SYSTOLIC BLOOD PRESSURE: 124 MMHG | RESPIRATION RATE: 18 BRPM | HEIGHT: 67 IN | OXYGEN SATURATION: 96 % | DIASTOLIC BLOOD PRESSURE: 83 MMHG | WEIGHT: 293 LBS | TEMPERATURE: 98.6 F | HEART RATE: 79 BPM | BODY MASS INDEX: 45.99 KG/M2

## 2025-02-27 DIAGNOSIS — Z20.822 CONTACT WITH AND (SUSPECTED) EXPOSURE TO COVID-19: ICD-10-CM

## 2025-02-27 DIAGNOSIS — B34.9 VIRAL ILLNESS: Primary | ICD-10-CM

## 2025-02-27 DIAGNOSIS — M79.10 MYALGIA: ICD-10-CM

## 2025-02-27 LAB
INFLUENZA A ANTIGEN, POC: NEGATIVE
INFLUENZA B ANTIGEN, POC: NEGATIVE
Lab: NORMAL
PERFORMING INSTRUMENT: NORMAL
QC PASS/FAIL: NORMAL
SARS-COV-2, POC: NORMAL

## 2025-02-27 RX ORDER — ONDANSETRON 4 MG/1
4 TABLET, ORALLY DISINTEGRATING ORAL 3 TIMES DAILY PRN
Qty: 21 TABLET | Refills: 0 | Status: SHIPPED | OUTPATIENT
Start: 2025-02-27

## 2025-02-27 RX ORDER — CETIRIZINE HYDROCHLORIDE 10 MG/1
10 TABLET ORAL DAILY
Qty: 30 TABLET | Refills: 0 | Status: SHIPPED | OUTPATIENT
Start: 2025-02-27

## 2025-02-27 ASSESSMENT — ENCOUNTER SYMPTOMS
RHINORRHEA: 1
WHEEZING: 0
VOMITING: 0
SORE THROAT: 1
DIARRHEA: 1
SINUS PRESSURE: 0
COUGH: 1
NAUSEA: 1
ABDOMINAL PAIN: 1
SHORTNESS OF BREATH: 0

## 2025-02-27 NOTE — PROGRESS NOTES
Yamila Morgan is a 49 y.o. adult     Established patient, here for evaluation of the following chief complaint(s):  Chills (Pt c/o symptoms started yesterday ), Abdominal Pain (Pt c/o stabbing stomach pains ), Diarrhea, Sweats, Headache, Anorexia, Nasal Congestion, and Generalized Body Aches        Assessment & Plan :  Visit Diagnoses and Associated Orders       Viral illness    -  Primary         Myalgia        POCT COVID-19, Antigen [QRK366 Custom]      POCT Influenza A/B Antigen [43198 Custom]           Contact with and (suspected) exposure to covid-19             ORDERS WITHOUT AN ASSOCIATED DIAGNOSIS    ondansetron (ZOFRAN-ODT) 4 MG disintegrating tablet [62329]      cetirizine (ZYRTEC) 10 MG tablet [9506]            -Conservative management discussed with patient.  -Increase fluid intake.  -Return precautions reviewed with   Follow up in 5 days if symptoms persist or if symptoms worsen.  POCT COVID-19, Antigen        Component Value Flag Ref Range Units Status    SARS-COV-2, POC Not-Detected      Not Detected  Final    Lot Number [id number]        Final    QC Pass/Fail pass        Final    Performing Instrument BinaxNOW        Final                  POCT Influenza A/B Antigen        Component    Inflenza A Ag    negative      Influenza B Ag    negative                          HPI:   49 y.o. adult presents with symptoms of viral upper respiratory infection, most consistent with influenza    Subjective :  HPI      Review of Systems   Constitutional:  Positive for activity change, chills, fatigue and fever.   HENT:  Positive for congestion, postnasal drip, rhinorrhea and sore throat. Negative for sinus pressure.    Respiratory:  Positive for cough. Negative for shortness of breath and wheezing.    Gastrointestinal:  Positive for abdominal pain, diarrhea and nausea. Negative for vomiting.   Musculoskeletal:  Positive for myalgias.           Vitals:    02/27/25 0958   BP: 124/83   Site: Right Upper Arm   Position:

## 2025-03-02 LAB
BUN SERPL-MCNC: 6 MG/DL (ref 6–24)
BUN/CREAT SERPL: 7 (ref 9–23)
CALCIUM SERPL-MCNC: 9.2 MG/DL (ref 8.7–10.2)
CHLORIDE SERPL-SCNC: 101 MMOL/L (ref 96–106)
CO2 SERPL-SCNC: 20 MMOL/L (ref 20–29)
CREAT SERPL-MCNC: 0.84 MG/DL (ref 0.57–1)
EGFRCR SERPLBLD CKD-EPI 2021: 86 ML/MIN/1.73
GLUCOSE SERPL-MCNC: 87 MG/DL (ref 70–99)
INSULIN SERPL-ACNC: 13.4 UIU/ML (ref 2.6–24.9)
LDH SERPL L TO P-CCNC: 234 IU/L (ref 119–226)
POTASSIUM SERPL-SCNC: 4.4 MMOL/L (ref 3.5–5.2)
SODIUM SERPL-SCNC: 136 MMOL/L (ref 134–144)

## 2025-03-03 LAB — HBA1C MFR BLD: 6.3 % (ref 4.8–5.6)

## 2025-04-23 ENCOUNTER — OFFICE VISIT (OUTPATIENT)
Age: 50
End: 2025-04-23
Payer: COMMERCIAL

## 2025-04-23 VITALS
TEMPERATURE: 95.5 F | HEIGHT: 67 IN | DIASTOLIC BLOOD PRESSURE: 70 MMHG | BODY MASS INDEX: 47.77 KG/M2 | HEART RATE: 84 BPM | OXYGEN SATURATION: 98 % | SYSTOLIC BLOOD PRESSURE: 120 MMHG

## 2025-04-23 DIAGNOSIS — R51.9 INTRACTABLE HEADACHE, UNSPECIFIED CHRONICITY PATTERN, UNSPECIFIED HEADACHE TYPE: Primary | ICD-10-CM

## 2025-04-23 PROCEDURE — 99204 OFFICE O/P NEW MOD 45 MIN: CPT | Performed by: PSYCHIATRY & NEUROLOGY

## 2025-04-23 RX ORDER — SUMATRIPTAN 50 MG/1
50 TABLET, FILM COATED ORAL
Qty: 9 TABLET | Refills: 3 | Status: SHIPPED | OUTPATIENT
Start: 2025-04-23

## 2025-04-23 NOTE — PROGRESS NOTES
NEUROLOGY NEW PATIENT CONSULTATION      4/23/2025    RE: Yamila Morgan    REFERRED BY:  Luisa Sloan, KAROLINE - NP    CHIEF COMPLAINT:  This is Yamila Morgan is a 49 y.o. adult who had concerns including New Patient and Neurologic Problem.    HPI:         History of Present Illness  The patient presents for evaluation of headaches.    Headaches have been associated with sinus-related issues, persisting for some time. A previous specialist noted narrow nasal passages. An ENT specialist recommended a consultation with a neurologist to rule out sinus disease and suggested an MRI scan, which has not yet been scheduled. Nasacort was prescribed, and a referral to an allergist was made; the allergist has already been seen. Headaches were more severe in youth but have decreased in frequency, now occurring approximately once a month. These headaches are predominantly frontal, characterized by throbbing and pressure, and are accompanied by nausea, photophobia, and phonophobia. Excessive use of nasal spray induces nausea, limiting its use. Over-the-counter Tylenol provides some relief. No migraine-specific medications such as sumatriptan, Imitrex, or Topamax have been tried, and there is no interest in doing so.    Sleep apnea has developed, and there is difficulty using the face mask due to sinus issues.    FAMILY HISTORY  - Mother: Migraines    MEDICATIONS  CURRENT MEDS:  Nasacort  PREVIOUS MEDS:  Tylenol Oral        Review of Systems    All other systems reviewed and are negative    PMH  Past Medical History:   Diagnosis Date    GERD (gastroesophageal reflux disease)     Headache     Low back pain     Obesity     Sinus congestion     Sleep apnea        Social Hx  Social History     Socioeconomic History    Marital status:    Tobacco Use    Smoking status: Never    Smokeless tobacco: Never   Vaping Use    Vaping status: Never Used   Substance and Sexual Activity    Alcohol use: Yes     Alcohol/week: 1.0 standard drink

## 2025-09-05 ENCOUNTER — OFFICE VISIT (OUTPATIENT)
Age: 50
End: 2025-09-05
Payer: COMMERCIAL

## 2025-09-05 VITALS
SYSTOLIC BLOOD PRESSURE: 122 MMHG | DIASTOLIC BLOOD PRESSURE: 82 MMHG | OXYGEN SATURATION: 99 % | RESPIRATION RATE: 16 BRPM | BODY MASS INDEX: 45.99 KG/M2 | HEART RATE: 65 BPM | HEIGHT: 67 IN | WEIGHT: 293 LBS

## 2025-09-05 DIAGNOSIS — Z13.220 LIPID SCREENING: ICD-10-CM

## 2025-09-05 DIAGNOSIS — R73.03 PRE-DIABETES: ICD-10-CM

## 2025-09-05 DIAGNOSIS — E66.01 MORBID OBESITY (HCC): ICD-10-CM

## 2025-09-05 DIAGNOSIS — Z00.00 WELL ADULT EXAM: Primary | ICD-10-CM

## 2025-09-05 LAB
EST. AVERAGE GLUCOSE BLD GHB EST-MCNC: 126 MG/DL
HBA1C MFR BLD: 6 % (ref 4.8–5.6)

## 2025-09-05 PROCEDURE — 99396 PREV VISIT EST AGE 40-64: CPT | Performed by: INTERNAL MEDICINE

## 2025-09-05 RX ORDER — LORATADINE 10 MG/1
10 TABLET ORAL DAILY
COMMUNITY

## 2025-09-05 RX ORDER — MULTIVIT-MIN/IRON/FOLIC ACID/K 18-600-40
2000 CAPSULE ORAL DAILY
COMMUNITY

## 2025-09-05 ASSESSMENT — ENCOUNTER SYMPTOMS
RESPIRATORY NEGATIVE: 1
GASTROINTESTINAL NEGATIVE: 1
BACK PAIN: 1

## 2025-09-06 LAB
ALBUMIN SERPL-MCNC: 4.2 G/DL (ref 3.9–4.9)
ALP SERPL-CCNC: 68 IU/L (ref 44–121)
ALT SERPL-CCNC: 15 IU/L (ref 0–32)
AST SERPL-CCNC: 19 IU/L (ref 0–40)
BILIRUB SERPL-MCNC: 0.3 MG/DL (ref 0–1.2)
BUN SERPL-MCNC: 7 MG/DL (ref 6–24)
BUN/CREAT SERPL: 8 (ref 9–23)
CALCIUM SERPL-MCNC: 9.3 MG/DL (ref 8.7–10.2)
CHLORIDE SERPL-SCNC: 100 MMOL/L (ref 96–106)
CHOLEST SERPL-MCNC: 236 MG/DL (ref 100–199)
CO2 SERPL-SCNC: 21 MMOL/L (ref 20–29)
CREAT SERPL-MCNC: 0.87 MG/DL (ref 0.57–1)
EGFRCR SERPLBLD CKD-EPI 2021: 82 ML/MIN/1.73
GLOBULIN SER CALC-MCNC: 3.5 G/DL (ref 1.5–4.5)
GLUCOSE SERPL-MCNC: 93 MG/DL (ref 70–99)
HDLC SERPL-MCNC: 58 MG/DL
IMP & REVIEW OF LAB RESULTS: NORMAL
LDLC SERPL CALC-MCNC: 165 MG/DL (ref 0–99)
POTASSIUM SERPL-SCNC: 4.7 MMOL/L (ref 3.5–5.2)
PROT SERPL-MCNC: 7.7 G/DL (ref 6–8.5)
SODIUM SERPL-SCNC: 136 MMOL/L (ref 134–144)
TRIGL SERPL-MCNC: 76 MG/DL (ref 0–149)
VLDLC SERPL CALC-MCNC: 13 MG/DL (ref 5–40)